# Patient Record
Sex: MALE | URBAN - METROPOLITAN AREA
[De-identification: names, ages, dates, MRNs, and addresses within clinical notes are randomized per-mention and may not be internally consistent; named-entity substitution may affect disease eponyms.]

---

## 2019-10-30 ENCOUNTER — EMERGENCY (EMERGENCY)
Facility: HOSPITAL | Age: 39
LOS: 1 days | Discharge: HOME | End: 2019-10-30
Attending: EMERGENCY MEDICINE | Admitting: EMERGENCY MEDICINE
Payer: SELF-PAY

## 2019-10-30 VITALS
OXYGEN SATURATION: 99 % | TEMPERATURE: 98 F | SYSTOLIC BLOOD PRESSURE: 135 MMHG | DIASTOLIC BLOOD PRESSURE: 81 MMHG | RESPIRATION RATE: 18 BRPM | HEART RATE: 89 BPM

## 2019-10-30 VITALS
RESPIRATION RATE: 18 BRPM | SYSTOLIC BLOOD PRESSURE: 164 MMHG | HEART RATE: 93 BPM | OXYGEN SATURATION: 100 % | DIASTOLIC BLOOD PRESSURE: 93 MMHG

## 2019-10-30 PROCEDURE — 99284 EMERGENCY DEPT VISIT MOD MDM: CPT

## 2019-10-30 RX ORDER — HYDROMORPHONE HYDROCHLORIDE 2 MG/ML
1 INJECTION INTRAMUSCULAR; INTRAVENOUS; SUBCUTANEOUS ONCE
Refills: 0 | Status: DISCONTINUED | OUTPATIENT
Start: 2019-10-30 | End: 2019-10-30

## 2019-10-30 RX ORDER — HYDROMORPHONE HYDROCHLORIDE 2 MG/ML
3 INJECTION INTRAMUSCULAR; INTRAVENOUS; SUBCUTANEOUS ONCE
Refills: 0 | Status: DISCONTINUED | OUTPATIENT
Start: 2019-10-30 | End: 2019-10-30

## 2019-10-30 RX ADMIN — HYDROMORPHONE HYDROCHLORIDE 3 MILLIGRAM(S): 2 INJECTION INTRAMUSCULAR; INTRAVENOUS; SUBCUTANEOUS at 22:07

## 2019-10-30 RX ADMIN — HYDROMORPHONE HYDROCHLORIDE 1 MILLIGRAM(S): 2 INJECTION INTRAMUSCULAR; INTRAVENOUS; SUBCUTANEOUS at 21:35

## 2019-10-30 NOTE — ED ADULT NURSE REASSESSMENT NOTE - NS ED NURSE REASSESS COMMENT FT1
Patient yelling, streaming and threatening staff members.  He refused blood work until he receives additional doses of Dilaudid.  Dr. Power notified.  Patient verbally abusive to doctor and yelling derogatory terms.  Unable to redirect patient.  Security called, IV removed and patient escorted from MD.  Charge nurse Jane west.

## 2019-10-30 NOTE — ED ADULT TRIAGE NOTE - CHIEF COMPLAINT QUOTE
Patient states that he was in [place] and returned 2 days ago. He was around his cousin and smoking cigarettes behind him and since then he has been coughing up blood, chest pain. Pt has hx of MI with 4 stents. Also cousin has TB so he states that is the cousin he was smoking behind,

## 2019-10-30 NOTE — ED PROVIDER NOTE - ATTENDING CONTRIBUTION TO CARE
Tono: I have seen and examined the patient face to face, have reviewed and addended the HPI, PE and a/p as necessary.    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  Pt reports he was in [facility] for a  and [facility].  [facility] and will be following up in the morning but reports his sickle cell pain has been worsening and he currently has knee, hip and chest pain which is typical of his sickle cell pain.  Pt is requesting IM dilaudid prior to completing exam and work up.  Refusing IV and blood work until he received pain meds.   Patient reports he takes hydroxyurea but has been unable to tolerate it over the past few days.    GEN - NAD; well appearing; A+O x3; non-toxic appearing  CARD -s1s2, RRR, no M,G,R;   PULM - CTA b/l, symmetric breath sounds;   ABD -  +BS, ND, NT, soft, no guarding, no rebound, no masses;   BACK - no CVA tenderness, Normal  spine;   EXT - symmetric pulses, 2+ dp, capillary refill < 2 seconds, no clubbing, no cyanosis, no edema  NEURO - no focal neuro deficits, no slurred speech    Prior to completion of evaluation, patient became agitated and belligerent towards nursing staff and physician team.  Pt requesting only US IV and received IM dilaudid prior to receiving IV.  Discussed with patient, that a medical work up is required given his symptoms of nausea, vomiting and hemoptysis and pain.  US IV obtained by Dr. Varghese, and upon going to send blood tubes, patient refused to let blood work sent until additional medications was given. At this point patient became more agitated, swearing at Dr. Varghese, the RN staff and myself demanding additional pain medication.  I discussed with the patient, that if he is not going to let us further evaluate his complaints with blood work, IVF, and chest xray, then we will not be providing any further medications,  The patient was at that time starting to walk around halls still refusing to allow bloodwork to be sent until more pain meds were ordered, and swear more at staff. It was explained to him that without further medical evaluation which he was not allowing he could not continue to be treated and given medication. Was ambulating appropriately, in NAD, aggressive, was escorted by security to exit and IV removed.

## 2019-10-30 NOTE — ED ADULT NURSE NOTE - OBJECTIVE STATEMENT
patient alert ox3 came in c/o coughing up blood with chest pain and head ache. states he had fever 2 days ago . patient returned from [place] 4 days ago and was exposed to his cousin with TB.. h/o SCD c/o chest pain. states he need US guided IV as he is difficult stick. NAD. [email or web address] to be seen by MD.

## 2019-10-30 NOTE — ED ADULT NURSE NOTE - NSIMPLEMENTINTERV_GEN_ALL_ED
Implemented All Universal Safety Interventions:  [place] to call system. Call bell, personal items and telephone within reach. Instruct patient to call for assistance. Room bathroom lighting operational. Non-slip footwear when patient is off stretcher. Physically safe environment: no spills, clutter or unnecessary equipment. Stretcher in lowest position, wheels locked, appropriate side rails in place.

## 2019-10-30 NOTE — ED PROVIDER NOTE - CLINICAL SUMMARY MEDICAL DECISION MAKING FREE TEXT BOX
Pt presenting with cough and chest pain became agitated was in NAD on exam and VS Wnl; became agitated uncooperative with staff aggressive unwilling to allow a medical evaluation and was discharged.

## 2019-10-30 NOTE — ED PROVIDER NOTE - PROGRESS NOTE DETAILS
Tono: Prior to completion of evaluation, patient became agitated and belligerent towards nursing staff and physician team.  Pt requesting only US IV and received IM dilaudid prior to receiving IV.  Discussed with patient, that a medical work up is required given his symptoms of nausea, vomiting and hemoptysis and pain.  US IV obtained by Dr. Varghese, and upon going to send blood tubes, patient refused to let blood work sent until additional medications was given. At this point patient became more agitated, swearing at Dr. Varghese, the RN staff and myself demanding additional pain medication.  I discussed with the patient, that if he is not going to let us further evaluate his complaints with blood work, IVF, and chest xray, then we will not be providing any further medications,  The patient was at that time starting to walk around halls still refusing to allow bloodwork to be sent until more pain meds were ordered, and swear more at staff. It was explained to him that without further medical evaluation which he was not allowing he could not continue to be treated and given medication. Was ambulating appropriately, in NAD, aggressive, was escorted by security to exit and IV removed.

## 2019-10-30 NOTE — ED PROVIDER NOTE - PATIENT PORTAL LINK FT
You can access the FollowMyHealth Patient Portal offered by [facility] by registering at the following website: [email or web address]. By joining [employer]’s FollowMyHealth portal, you will also be able to view your health information using other applications (apps) compatible with our system.

## 2019-10-30 NOTE — ED PROVIDER NOTE - OBJECTIVE STATEMENT
39 M reported hx of HbSS disease presents due to coughing up blood states he was recently in [place] visiting a cousin who has TB and was exposed to said cousin; noted he also had some chest pain. Patient refused to answer additional HPI questions until given IM dilaudid due to having chest pain. Upon questioning was unable to provide interviewer with name of a hematologist, [facility]ign [facility] and is [facility] from [facility] and sees "different doctors" there every time. Stated 39 M reported hx of HbSS disease presents due to coughing up blood states he was recently in [facility] visiting a cousin who has TB and was exposed to said cousin; noted he also had some chest pain. Stated his baseline Hb is around 6 and he takes hydroxyurea. Patient refused to answer additional HPI questions until given IM dilaudid due to having chest pain. Upon questioning was unable to provide interviewer with name of a hematologist, statign [facility] and is originally from [facility] and sees "different doctors" there every time. Was unable to state who prescribed him oral dilaudid that he takes for HbSS or what pharmacy he receives prescription from.     Pt received IM pain medication in order to tolerate US IV. A US IV was placed and bloodwork obtained. Pt demanded provider order add'l pain meds at terminal in the room, which provider did not and stated would order meds from outside terminal at which point pt grabbed blood tubes and became agitated, swearing at provider, demanding add'l pain medication. He was informed that no add'l pain medication would be given  until bloodwork was taken and sent at which point a provider from another part of the ER noticed the patient and stated that the patient has been here before with similar complaints and acted similarly inappropriately and aggressive to staff. The patient was at that time starting to walk around halls still refusing to allow bloodwork to be sent until more pain meds were ordered, and swear more at staff. It was explained to him that without further medical evaluation which he was not allowing he could not continue to be treated and given medication. Was ambulating appropriately, in NAD, aggressive, was escorted by security to exit and IV removed.
